# Patient Record
Sex: FEMALE | Race: ASIAN | NOT HISPANIC OR LATINO
[De-identification: names, ages, dates, MRNs, and addresses within clinical notes are randomized per-mention and may not be internally consistent; named-entity substitution may affect disease eponyms.]

---

## 2023-01-23 ENCOUNTER — NON-APPOINTMENT (OUTPATIENT)
Age: 28
End: 2023-01-23

## 2023-01-26 PROBLEM — Z00.00 ENCOUNTER FOR PREVENTIVE HEALTH EXAMINATION: Status: ACTIVE | Noted: 2023-01-26

## 2023-01-27 ENCOUNTER — APPOINTMENT (OUTPATIENT)
Dept: OBGYN | Facility: CLINIC | Age: 28
End: 2023-01-27
Payer: COMMERCIAL

## 2023-01-27 VITALS
WEIGHT: 95 LBS | DIASTOLIC BLOOD PRESSURE: 81 MMHG | BODY MASS INDEX: 16.83 KG/M2 | SYSTOLIC BLOOD PRESSURE: 114 MMHG | HEART RATE: 106 BPM | HEIGHT: 63 IN

## 2023-01-27 DIAGNOSIS — R39.9 UNSPECIFIED SYMPTOMS AND SIGNS INVOLVING THE GENITOURINARY SYSTEM: ICD-10-CM

## 2023-01-27 DIAGNOSIS — Z01.419 ENCOUNTER FOR GYNECOLOGICAL EXAMINATION (GENERAL) (ROUTINE) W/OUT ABNORMAL FINDINGS: ICD-10-CM

## 2023-01-27 LAB
BILIRUB UR QL STRIP: NORMAL
CLARITY UR: CLEAR
COLLECTION METHOD: NORMAL
GLUCOSE UR-MCNC: NORMAL
HCG UR QL: 1 EU/DL
HGB UR QL STRIP.AUTO: NORMAL
KETONES UR-MCNC: NORMAL
LEUKOCYTE ESTERASE UR QL STRIP: NORMAL
NITRITE UR QL STRIP: NORMAL
PH UR STRIP: 7
PROT UR STRIP-MCNC: NORMAL
SP GR UR STRIP: 1.02

## 2023-01-27 PROCEDURE — 81002 URINALYSIS NONAUTO W/O SCOPE: CPT

## 2023-01-27 PROCEDURE — 99385 PREV VISIT NEW AGE 18-39: CPT | Mod: 25

## 2023-01-27 NOTE — PHYSICAL EXAM
[Chaperone Present] : A chaperone was present in the examining room during all aspects of the physical examination [Appropriately responsive] : appropriately responsive [Alert] : alert [No Acute Distress] : no acute distress [No Lymphadenopathy] : no lymphadenopathy [Regular Rate Rhythm] : regular rate rhythm [No Murmurs] : no murmurs [Clear to Auscultation B/L] : clear to auscultation bilaterally [Soft] : soft [Non-tender] : non-tender [Non-distended] : non-distended [No HSM] : No HSM [No Lesions] : no lesions [No Mass] : no mass [Oriented x3] : oriented x3 [Examination Of The Breasts] : a normal appearance [No Masses] : no breast masses were palpable [Labia Minora] : normal [Labia Majora] : normal [Normal] : normal [Uterine Adnexae] : normal [FreeTextEntry9] : Guaiac test negative, no masses noted

## 2023-01-27 NOTE — PLAN
[FreeTextEntry1] : 2023 IVA WHITE 27 year old female  \par Routine Gyn Exam:\par BSE taught\par Pap/gc/chlam  conducted\par \par Birth Control Counseling\par Discussed R/B/A of an IUD, option of OCPS d/w pt\par Discussed side effects including spotting, some nausea and possible moodiness r/b/a of all IUD\par condoms\par \par UTI?\par UA:  trace of protein \par small leukocytes  \par Ucx sent\par RTO in 1 year or PRN\par

## 2023-01-27 NOTE — COUNSELING
[Nutrition/ Exercise/ Weight Management] : nutrition, exercise, weight management [Vitamins/Supplements] : vitamins/supplements [Breast Self Exam] : breast self exam [Contraception/ Emergency Contraception/ Safe Sexual Practices] : contraception, emergency contraception, safe sexual practices [Confidentiality] : confidentiality

## 2023-01-27 NOTE — HISTORY OF PRESENT ILLNESS
[FreeTextEntry1] : 2023. 27 year old female   LMP 2023,PMH: UTI  She presents for annual gyn exam and offers no complaints. Regular menses. Denies breakthrough bleeding, abnormal vaginal discharge.  She reports no urinary or bowel complaints or blood stools.\par \par hx of recurrent UTIS and currently reports slight urinary discomfort\par \par Reviewed patient's current medications and allergies\par \par Denies changes in medical status, medications, serious illness, hospitalizations and surgeries\par \par No Fam hx of Breast , Colon, Uterine or Ovarian Ca\par \par +sexually active same partner and using condoms  s/p HPV shot x 3\par

## 2023-01-29 LAB
APPEARANCE: ABNORMAL
BACTERIA: ABNORMAL
BILIRUBIN URINE: NEGATIVE
BLOOD URINE: NEGATIVE
C TRACH RRNA SPEC QL NAA+PROBE: NOT DETECTED
COLOR: YELLOW
GLUCOSE QUALITATIVE U: NEGATIVE
HYALINE CASTS: 2 /LPF
KETONES URINE: NEGATIVE
LEUKOCYTE ESTERASE URINE: NEGATIVE
MICROSCOPIC-UA: NORMAL
N GONORRHOEA RRNA SPEC QL NAA+PROBE: NOT DETECTED
NITRITE URINE: NEGATIVE
PH URINE: 7.5
PROTEIN URINE: NORMAL
RED BLOOD CELLS URINE: 2 /HPF
SOURCE AMPLIFICATION: NORMAL
SPECIFIC GRAVITY URINE: 1.02
SQUAMOUS EPITHELIAL CELLS: 2 /HPF
UROBILINOGEN URINE: NORMAL
WHITE BLOOD CELLS URINE: 9 /HPF

## 2023-01-31 LAB — CYTOLOGY CVX/VAG DOC THIN PREP: NORMAL

## 2023-01-31 RX ORDER — NITROFURANTOIN (MONOHYDRATE/MACROCRYSTALS) 25; 75 MG/1; MG/1
100 CAPSULE ORAL
Qty: 14 | Refills: 0 | Status: ACTIVE | COMMUNITY
Start: 2023-01-31 | End: 1900-01-01

## 2023-02-13 ENCOUNTER — APPOINTMENT (OUTPATIENT)
Dept: OTOLARYNGOLOGY | Facility: CLINIC | Age: 28
End: 2023-02-13

## 2023-03-01 ENCOUNTER — NON-APPOINTMENT (OUTPATIENT)
Age: 28
End: 2023-03-01

## 2023-03-01 ENCOUNTER — APPOINTMENT (OUTPATIENT)
Dept: OBGYN | Facility: CLINIC | Age: 28
End: 2023-03-01

## 2023-03-01 ENCOUNTER — APPOINTMENT (OUTPATIENT)
Dept: OTOLARYNGOLOGY | Facility: CLINIC | Age: 28
End: 2023-03-01
Payer: COMMERCIAL

## 2023-03-01 VITALS — SYSTOLIC BLOOD PRESSURE: 111 MMHG | HEART RATE: 78 BPM | DIASTOLIC BLOOD PRESSURE: 76 MMHG

## 2023-03-01 DIAGNOSIS — R60.9 EDEMA, UNSPECIFIED: ICD-10-CM

## 2023-03-01 DIAGNOSIS — Z78.9 OTHER SPECIFIED HEALTH STATUS: ICD-10-CM

## 2023-03-01 DIAGNOSIS — Z83.3 FAMILY HISTORY OF DIABETES MELLITUS: ICD-10-CM

## 2023-03-01 DIAGNOSIS — Z82.49 FAMILY HISTORY OF ISCHEMIC HEART DISEASE AND OTHER DISEASES OF THE CIRCULATORY SYSTEM: ICD-10-CM

## 2023-03-01 PROCEDURE — 99202 OFFICE O/P NEW SF 15 MIN: CPT

## 2023-03-01 NOTE — HISTORY OF PRESENT ILLNESS
[de-identified] : 27 year old female presents for neck pain. She reports occurs after she eats spicy or sour foods. She notes swelling in the submandibular area which then goes away after about an hour. Denies dry mouth. Denies dry eyes. Denies family history of autoimmune disease. \par Reports has had intermittent neck pain for about 5 years - experiences 1-2x a year. \par Reports pain has been more frequent, almost every other month for the past year. \par Pain normally resolves on own, does not take any medications for relief. \par Denies dysphagia , odynophagia, dyspnea, dysphonia, otalgia, bleeding, hemoptysis, mucus production. \par Recent fevers and/or throat infections. \par Denies use of over the counter antacids or reflux medications. \par

## 2023-03-01 NOTE — PHYSICAL EXAM
[Midline] : trachea located in midline position [Normal] : no rashes [de-identified] : R>L SMG firmness, tenderness on left

## 2023-03-01 NOTE — ASSESSMENT
[FreeTextEntry1] : 27 year old female with submandibular gland swelling after eating certain foods, resolves on its own. Today she does have some signs of sialadenitis. We discussed hydration, warm compresses, massage of the area. At this time, would avoid foods that trigger worsening of symptoms. We will also obtain US head and neck.

## 2023-03-07 ENCOUNTER — RESULT REVIEW (OUTPATIENT)
Age: 28
End: 2023-03-07

## 2023-03-07 ENCOUNTER — APPOINTMENT (OUTPATIENT)
Dept: ULTRASOUND IMAGING | Facility: CLINIC | Age: 28
End: 2023-03-07
Payer: COMMERCIAL

## 2023-03-07 ENCOUNTER — OUTPATIENT (OUTPATIENT)
Dept: OUTPATIENT SERVICES | Facility: HOSPITAL | Age: 28
LOS: 1 days | End: 2023-03-07
Payer: COMMERCIAL

## 2023-03-07 DIAGNOSIS — R60.9 EDEMA, UNSPECIFIED: ICD-10-CM

## 2023-03-07 PROCEDURE — 76536 US EXAM OF HEAD AND NECK: CPT | Mod: 26

## 2023-03-07 PROCEDURE — 76536 US EXAM OF HEAD AND NECK: CPT

## 2023-04-03 LAB
APPEARANCE: ABNORMAL
BACTERIA: NEGATIVE
BILIRUBIN URINE: NEGATIVE
BLOOD URINE: NEGATIVE
COLOR: YELLOW
GLUCOSE QUALITATIVE U: NEGATIVE
HYALINE CASTS: 0 /LPF
KETONES URINE: NEGATIVE
LEUKOCYTE ESTERASE URINE: NEGATIVE
MICROSCOPIC-UA: NORMAL
NITRITE URINE: NEGATIVE
PH URINE: 8
PROTEIN URINE: NORMAL
RED BLOOD CELLS URINE: 2 /HPF
SPECIFIC GRAVITY URINE: 1.02
SQUAMOUS EPITHELIAL CELLS: 1 /HPF
UROBILINOGEN URINE: NORMAL
WHITE BLOOD CELLS URINE: 1 /HPF

## 2023-04-03 RX ORDER — CEPHALEXIN 500 MG/1
500 CAPSULE ORAL 4 TIMES DAILY
Qty: 28 | Refills: 0 | Status: ACTIVE | COMMUNITY
Start: 2023-04-03 | End: 1900-01-01

## 2023-05-23 ENCOUNTER — APPOINTMENT (OUTPATIENT)
Dept: UROGYNECOLOGY | Facility: CLINIC | Age: 28
End: 2023-05-23
Payer: MEDICARE

## 2023-05-23 VITALS
BODY MASS INDEX: 16.83 KG/M2 | HEIGHT: 63 IN | HEART RATE: 68 BPM | SYSTOLIC BLOOD PRESSURE: 99 MMHG | WEIGHT: 95 LBS | DIASTOLIC BLOOD PRESSURE: 68 MMHG | OXYGEN SATURATION: 100 %

## 2023-05-23 DIAGNOSIS — N39.0 URINARY TRACT INFECTION, SITE NOT SPECIFIED: ICD-10-CM

## 2023-05-23 DIAGNOSIS — Z78.9 OTHER SPECIFIED HEALTH STATUS: ICD-10-CM

## 2023-05-23 DIAGNOSIS — R39.15 URGENCY OF URINATION: ICD-10-CM

## 2023-05-23 LAB
BILIRUB UR QL STRIP: NORMAL
CLARITY UR: CLEAR
COLLECTION METHOD: NORMAL
GLUCOSE UR-MCNC: NORMAL
HCG UR QL: 0.2 EU/DL
HGB UR QL STRIP.AUTO: NORMAL
KETONES UR-MCNC: NORMAL
LEUKOCYTE ESTERASE UR QL STRIP: NORMAL
NITRITE UR QL STRIP: NORMAL
PH UR STRIP: 0.2
PROT UR STRIP-MCNC: NORMAL
SP GR UR STRIP: 5.5

## 2023-05-23 PROCEDURE — 99204 OFFICE O/P NEW MOD 45 MIN: CPT | Mod: 25

## 2023-05-23 PROCEDURE — 51701 INSERT BLADDER CATHETER: CPT

## 2023-05-23 RX ORDER — NITROFURANTOIN MACROCRYSTALS 50 MG/1
50 CAPSULE ORAL
Qty: 90 | Refills: 1 | Status: ACTIVE | COMMUNITY
Start: 2023-05-23 | End: 1900-01-01

## 2023-05-23 NOTE — HISTORY OF PRESENT ILLNESS
[FreeTextEntry1] : \par Scot presents with a h/o recurrent UTIs. She reports UTIs since she became sexually active, first one in 2015 or 2017. She had hematuria at that time and underwent workup with urinary tract imaging which she reports was negative. She then got one UTI in 2018 and had about 1 per year until 2023, where she has had 2 since January. Urine cultures reviewed. She is sexually active with partner ~1 time per month. She reports noticing association between sex and UTI. She reports UTI symptoms include cloudy urine and urgency.

## 2023-05-23 NOTE — PHYSICAL EXAM
[Chaperone Present] : A chaperone was present in the examining room during all aspects of the physical examination [Normal Appearance] : general appearance was normal [Exam Deferred] : was deferred [None] : no CVA tenderness [Normal] : normal [FreeTextEntry1] : \par General: Well, appearing. Alert and orientated. No acute distress\par HEENT: Normocephalic, atraumatic and extraocular muscles appear to be intact \par Neck: Full range of motion, no obvious lymphadenopathy, deformities, or masses noted \par Respiratory: Speaking in full sentences comfortably, normal work of breathing and no cough during visit\par Musculoskeletal: active full range of motion in extremities \par Extremities: No upper extremity edema noted\par Skin: no obvious rash or skin lesions\par Neuro: Orientated X 3, speech is fluent, normal rate\par Psych: Normal mood and affect \par  [Tenderness] : ~T no ~M abdominal tenderness observed [Distended] : not distended

## 2023-05-23 NOTE — DISCUSSION/SUMMARY
[FreeTextEntry1] :  Recurrent UTIs\par -We reviewed methods of prophylaxis extensively. \par -Post-coital ppx with Nitrofurantoin 50 mg \par -Daily Probiotic\par -Daily Cranberry tablets, ex Ellura\par -If UTI symptoms, try Cystex OTC first. If symptoms persist, must obtain urine culture prior to starting treatment\par -If continues to get UTIs despite ppx, will obtain further workup with renal sonogram and cystoscopy \par \par The following treatment plan was designed for this patient and provided to her in written form and reviewed extensively. Patient was given a copy to take home: \par For UTI Prevention: \par 1.	Take Nitrofurantoin 50 mg within 30 minutes of having sexual intercourse\par 2.	Probiotic daily (ex Culturelle)\par 3.	Cranberry supplement daily (ex. Ellura or TheraCran)\par \par \par If get UTI symptoms, take over the counter Cystex every 8 hours for symptoms. If symptoms continue, must get urine culture prior to starting treatment. \par \par If get urine culture at outside facility, fax results to \par

## 2023-05-24 PROBLEM — Z78.9 SOCIAL ALCOHOL USE: Status: ACTIVE | Noted: 2023-05-24

## 2023-11-28 ENCOUNTER — APPOINTMENT (OUTPATIENT)
Dept: UROGYNECOLOGY | Facility: CLINIC | Age: 28
End: 2023-11-28